# Patient Record
Sex: FEMALE | Race: WHITE | HISPANIC OR LATINO | URBAN - METROPOLITAN AREA
[De-identification: names, ages, dates, MRNs, and addresses within clinical notes are randomized per-mention and may not be internally consistent; named-entity substitution may affect disease eponyms.]

---

## 2017-04-19 ENCOUNTER — EMERGENCY (EMERGENCY)
Facility: HOSPITAL | Age: 77
LOS: 1 days | Discharge: HOME | End: 2017-04-19
Attending: EMERGENCY MEDICINE
Payer: COMMERCIAL

## 2017-04-19 VITALS
RESPIRATION RATE: 18 BRPM | TEMPERATURE: 98 F | DIASTOLIC BLOOD PRESSURE: 55 MMHG | OXYGEN SATURATION: 99 % | WEIGHT: 97 LBS | HEIGHT: 63 IN | HEART RATE: 62 BPM | SYSTOLIC BLOOD PRESSURE: 113 MMHG

## 2017-04-19 VITALS
SYSTOLIC BLOOD PRESSURE: 113 MMHG | HEART RATE: 58 BPM | RESPIRATION RATE: 17 BRPM | DIASTOLIC BLOOD PRESSURE: 52 MMHG | TEMPERATURE: 97 F | OXYGEN SATURATION: 100 %
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
## 2017-04-19 DIAGNOSIS — I10 ESSENTIAL (PRIMARY) HYPERTENSION: ICD-10-CM
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
## 2017-04-19 DIAGNOSIS — S50.869A: ICD-10-CM
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
## 2017-04-19 PROCEDURE — 99281 EMR DPT VST MAYX REQ PHY/QHP: CPT

## 2017-04-19 PROCEDURE — 99285 EMERGENCY DEPT VISIT HI MDM: CPT | Mod: 25

## 2017-04-20 NOTE — ED PROVIDER NOTE - OBJECTIVE STATEMENT
78 y/o F pt with PMHx of HTN, presents to ED c/o multiple itchy mosquito bites s/p traveling to [place]. Pt admits to scratching them and developing rashes. Pt denies fever, chills, nausea, vomiting, diarrhea, SOB, chest pain, or any other complaints. NKDA.

## 2017-04-20 NOTE — ED PROVIDER NOTE - NS ED MD SCRIBE ATTENDING SCRIBE SECTIONS
HIV/PHYSICAL EXAM/PAST MEDICAL/SURGICAL/SOCIAL HISTORY/REVIEW OF SYSTEMS/DISPOSITION/HISTORY OF PRESENT ILLNESS/VITAL SIGNS( [place])

## 2017-04-21 ENCOUNTER — TRANSCRIPTION ENCOUNTER (OUTPATIENT)
Age: 77
End: 2017-04-21

## 2018-05-17 ENCOUNTER — INPATIENT (INPATIENT)
Facility: HOSPITAL | Age: 78
LOS: 3 days | Discharge: HOME | DRG: 312 | End: 2018-05-21
Attending: GENERAL PRACTICE | Admitting: GENERAL PRACTICE
Payer: COMMERCIAL

## 2018-05-17 VITALS
DIASTOLIC BLOOD PRESSURE: 77 MMHG | HEART RATE: 90 BPM | RESPIRATION RATE: 17 BRPM | SYSTOLIC BLOOD PRESSURE: 166 MMHG | OXYGEN SATURATION: 97 % | TEMPERATURE: 98 F

## 2018-05-17 LAB
ALBUMIN SERPL ELPH-MCNC: 3.5 G/DL — SIGNIFICANT CHANGE UP (ref 3.5–5)
ALP SERPL-CCNC: 100 U/L — SIGNIFICANT CHANGE UP (ref 40–120)
ALT FLD-CCNC: 36 U/L DA — SIGNIFICANT CHANGE UP (ref 10–60)
ANION GAP SERPL CALC-SCNC: 4 MMOL/L — LOW (ref 5–17)
AST SERPL-CCNC: 21 U/L — SIGNIFICANT CHANGE UP (ref 10–40)
BASOPHILS # BLD AUTO: 0.1 K/UL — SIGNIFICANT CHANGE UP (ref 0–0.2)
BASOPHILS NFR BLD AUTO: 0.9 % — SIGNIFICANT CHANGE UP (ref 0–2)
BILIRUB SERPL-MCNC: 0.4 MG/DL — SIGNIFICANT CHANGE UP (ref 0.2–1.2)
BUN SERPL-MCNC: 11 MG/DL — SIGNIFICANT CHANGE UP (ref 7–18)
CALCIUM SERPL-MCNC: 9 MG/DL — SIGNIFICANT CHANGE UP (ref 8.4–10.5)
CHLORIDE SERPL-SCNC: 110 MMOL/L — HIGH (ref 96–108)
CO2 SERPL-SCNC: 29 MMOL/L — SIGNIFICANT CHANGE UP (ref 22–31)
CREAT SERPL-MCNC: 0.65 MG/DL — SIGNIFICANT CHANGE UP (ref 0.5–1.3)
EOSINOPHIL # BLD AUTO: 0.2 K/UL — SIGNIFICANT CHANGE UP (ref 0–0.5)
EOSINOPHIL NFR BLD AUTO: 2.7 % — SIGNIFICANT CHANGE UP (ref 0–6)
GLUCOSE SERPL-MCNC: 112 MG/DL — HIGH (ref 70–99)
HCT VFR BLD CALC: 39.4 % — SIGNIFICANT CHANGE UP (ref 34.5–45)
HGB BLD-MCNC: 13.2 G/DL — SIGNIFICANT CHANGE UP (ref 11.5–15.5)
LYMPHOCYTES # BLD AUTO: 1.3 K/UL — SIGNIFICANT CHANGE UP (ref 1–3.3)
LYMPHOCYTES # BLD AUTO: 18.7 % — SIGNIFICANT CHANGE UP (ref 13–44)
MCHC RBC-ENTMCNC: 30.9 PG — SIGNIFICANT CHANGE UP (ref 27–34)
MCHC RBC-ENTMCNC: 33.6 GM/DL — SIGNIFICANT CHANGE UP (ref 32–36)
MCV RBC AUTO: 91.9 FL — SIGNIFICANT CHANGE UP (ref 80–100)
MONOCYTES # BLD AUTO: 0.6 K/UL — SIGNIFICANT CHANGE UP (ref 0–0.9)
MONOCYTES NFR BLD AUTO: 9.4 % — SIGNIFICANT CHANGE UP (ref 2–14)
NEUTROPHILS # BLD AUTO: 4.6 K/UL — SIGNIFICANT CHANGE UP (ref 1.8–7.4)
NEUTROPHILS NFR BLD AUTO: 68.3 % — SIGNIFICANT CHANGE UP (ref 43–77)
PLATELET # BLD AUTO: 217 K/UL — SIGNIFICANT CHANGE UP (ref 150–400)
POTASSIUM SERPL-MCNC: 4 MMOL/L — SIGNIFICANT CHANGE UP (ref 3.5–5.3)
POTASSIUM SERPL-SCNC: 4 MMOL/L — SIGNIFICANT CHANGE UP (ref 3.5–5.3)
PROT SERPL-MCNC: 7.3 G/DL — SIGNIFICANT CHANGE UP (ref 6–8.3)
RBC # BLD: 4.29 M/UL — SIGNIFICANT CHANGE UP (ref 3.8–5.2)
RBC # FLD: 12.7 % — SIGNIFICANT CHANGE UP (ref 10.3–14.5)
SODIUM SERPL-SCNC: 143 MMOL/L — SIGNIFICANT CHANGE UP (ref 135–145)
TROPONIN I SERPL-MCNC: <0.015 NG/ML — SIGNIFICANT CHANGE UP (ref 0–0.04)
WBC # BLD: 6.8 K/UL — SIGNIFICANT CHANGE UP (ref 3.8–10.5)
WBC # FLD AUTO: 6.8 K/UL — SIGNIFICANT CHANGE UP (ref 3.8–10.5)

## 2018-05-17 PROCEDURE — 71046 X-RAY EXAM CHEST 2 VIEWS: CPT | Mod: 26

## 2018-05-17 RX ORDER — ASPIRIN/CALCIUM CARB/MAGNESIUM 324 MG
162 TABLET ORAL ONCE
Qty: 0 | Refills: 0 | Status: COMPLETED | OUTPATIENT
Start: 2018-05-17 | End: 2018-05-17

## 2018-05-17 RX ORDER — SODIUM CHLORIDE 9 MG/ML
1000 INJECTION INTRAMUSCULAR; INTRAVENOUS; SUBCUTANEOUS ONCE
Qty: 0 | Refills: 0 | Status: COMPLETED | OUTPATIENT
Start: 2018-05-17 | End: 2018-05-17

## 2018-05-17 RX ADMIN — Medication 162 MILLIGRAM(S): at 22:43

## 2018-05-17 RX ADMIN — SODIUM CHLORIDE 2000 MILLILITER(S): 9 INJECTION INTRAMUSCULAR; INTRAVENOUS; SUBCUTANEOUS at 22:44

## 2018-05-17 NOTE — ED PROVIDER NOTE - OBJECTIVE STATEMENT
Family as . 79 y/o F pt with PMHx of HTN (On Lisinopril) and HLD (on Simvastatin) and no significant PSHx presents with family to ED c/o palpitations with associated lightheadedness and general weakness since 18:30pm today (x1.5 hours PTA in ED). Pt's son also notes pt with episode of shaking earlier today. Additionally, pt reports chest pain. Pt describes palpitations as heart beat rapid. Per pt's son, pt visited her PMD earlier today and was started on Nadolol; pt has not yet begun taking Nadolol. Pt's son also states pt visited her cardiologist later in the evening today; on the walk back to her house from the cardiologist's office, pt begun experiencing current sx's, prompting pt to visit ED for evaluation. Pt reports normal urinary frequency today. Pt's son attributes pt's sx's to possible dehydration. Pt denies fever, chills, SOB, HA, vomiting, diarrhea, blurred vision, or any other complaints. Pt also denies Hx of DM, Hx of smoking, or EtOH use/abuse. Pt reports she does not know if she has undergone a stress test in the past. Pt notes taking Aspirin earlier today. Additional medications: Topiramate, Aspirin, Potassium. Cardiologist: Dr. Eric Goldberg. NKDA. Declines , son as . 79 y/o F pt with PMHx of HTN (On Lisinopril) and HLD (on Simvastatin) and no significant PSHx presents with family to ED c/o palpitations with associated lightheadedness and general weakness since 18:30pm today (x1.5 hours PTA in ED). Pt's son also notes pt with episode of tremors earlier today. Additionally, pt reports chest pain that began at the same time. Pt describes palpitations as heart beat rapid. Per pt's son, pt was started on Nadolol by PMD though has not yet begun taking Nadolol. Is asymptomatic at this time. Pt's son also states pt visited her cardiologist later in the evening today, who told her not to start the nadolol; on the walk back to her house from the cardiologist's office, pt begun experiencing current sx's, prompting pt to visit ED for evaluation. Pt reports her usual urinary frequency today. Pt's son attributes pt's sx's to possible dehydration. Pt denies fever, chills, SOB, HA, vomiting, diarrhea, blurred vision, or any other complaints. Pt also denies Hx of DM, Hx of smoking, or EtOH use/abuse. Pt reports she does not know if she has undergone a stress test in the past. Pt notes taking Aspirin earlier today. Additional medications: Topiramate, Aspirin, Potassium. PMD: Dr. Eric Goldberg. NKDA.

## 2018-05-17 NOTE — ED PROVIDER NOTE - CARE PLAN
Principal Discharge DX:	Chest pain on exertion  Secondary Diagnosis:	Palpitations  Secondary Diagnosis:	Mild dehydration

## 2018-05-17 NOTE — ED PROVIDER NOTE - PHYSICAL EXAMINATION
Afebrile, hemodynamically stable, saturating well  NAD, anxious, well appearing  Head NCAT  EOMI grossly, anicteric  MM mildly dry  No JVD  RRR, nml S1/S2, no m/r/g  Lungs CTAB, no w/r/r  Abd soft, NT, ND, nml BS, no rebound or guarding  AAO, CN's 3-12 grossly intact  MALDONADO spontaneously, no leg cyanosis or edema  Skin warm, dry, no rashes or hives

## 2018-05-18 DIAGNOSIS — E78.5 HYPERLIPIDEMIA, UNSPECIFIED: ICD-10-CM

## 2018-05-18 DIAGNOSIS — I10 ESSENTIAL (PRIMARY) HYPERTENSION: ICD-10-CM

## 2018-05-18 DIAGNOSIS — Z29.9 ENCOUNTER FOR PROPHYLACTIC MEASURES, UNSPECIFIED: ICD-10-CM

## 2018-05-18 DIAGNOSIS — R00.2 PALPITATIONS: ICD-10-CM

## 2018-05-18 DIAGNOSIS — R07.9 CHEST PAIN, UNSPECIFIED: ICD-10-CM

## 2018-05-18 LAB
24R-OH-CALCIDIOL SERPL-MCNC: 33 NG/ML — SIGNIFICANT CHANGE UP (ref 30–80)
CHOLEST SERPL-MCNC: 167 MG/DL — SIGNIFICANT CHANGE UP (ref 10–199)
CK MB BLD-MCNC: <1.6 % — SIGNIFICANT CHANGE UP (ref 0–3.5)
CK MB CFR SERPL CALC: <1 NG/ML — SIGNIFICANT CHANGE UP (ref 0–3.6)
CK SERPL-CCNC: 63 U/L — SIGNIFICANT CHANGE UP (ref 21–215)
HBA1C BLD-MCNC: 5.5 % — SIGNIFICANT CHANGE UP (ref 4–5.6)
HDLC SERPL-MCNC: 71 MG/DL — SIGNIFICANT CHANGE UP (ref 40–125)
LIPID PNL WITH DIRECT LDL SERPL: 82 MG/DL — SIGNIFICANT CHANGE UP
TOTAL CHOLESTEROL/HDL RATIO MEASUREMENT: 2.4 RATIO — LOW (ref 3.3–7.1)
TRIGL SERPL-MCNC: 68 MG/DL — SIGNIFICANT CHANGE UP (ref 10–149)
TROPONIN I SERPL-MCNC: <0.015 NG/ML — SIGNIFICANT CHANGE UP (ref 0–0.04)
TSH SERPL-MCNC: 0.66 UU/ML — SIGNIFICANT CHANGE UP (ref 0.34–4.82)
VIT B12 SERPL-MCNC: 802 PG/ML — SIGNIFICANT CHANGE UP (ref 232–1245)

## 2018-05-18 PROCEDURE — 99285 EMERGENCY DEPT VISIT HI MDM: CPT

## 2018-05-18 RX ORDER — LISINOPRIL 2.5 MG/1
10 TABLET ORAL DAILY
Qty: 0 | Refills: 0 | Status: DISCONTINUED | OUTPATIENT
Start: 2018-05-18 | End: 2018-05-21

## 2018-05-18 RX ORDER — OXYBUTYNIN CHLORIDE 5 MG
5 TABLET ORAL DAILY
Qty: 0 | Refills: 0 | Status: DISCONTINUED | OUTPATIENT
Start: 2018-05-18 | End: 2018-05-18

## 2018-05-18 RX ORDER — TOPIRAMATE 25 MG
50 TABLET ORAL
Qty: 0 | Refills: 0 | Status: DISCONTINUED | OUTPATIENT
Start: 2018-05-18 | End: 2018-05-21

## 2018-05-18 RX ORDER — SIMVASTATIN 20 MG/1
20 TABLET, FILM COATED ORAL AT BEDTIME
Qty: 0 | Refills: 0 | Status: DISCONTINUED | OUTPATIENT
Start: 2018-05-18 | End: 2018-05-21

## 2018-05-18 RX ORDER — ACETAMINOPHEN 500 MG
650 TABLET ORAL EVERY 6 HOURS
Qty: 0 | Refills: 0 | Status: DISCONTINUED | OUTPATIENT
Start: 2018-05-18 | End: 2018-05-21

## 2018-05-18 RX ORDER — ENOXAPARIN SODIUM 100 MG/ML
30 INJECTION SUBCUTANEOUS DAILY
Qty: 0 | Refills: 0 | Status: DISCONTINUED | OUTPATIENT
Start: 2018-05-18 | End: 2018-05-21

## 2018-05-18 RX ORDER — OXYBUTYNIN CHLORIDE 5 MG
5 TABLET ORAL DAILY
Qty: 0 | Refills: 0 | Status: DISCONTINUED | OUTPATIENT
Start: 2018-05-18 | End: 2018-05-21

## 2018-05-18 RX ORDER — ASPIRIN/CALCIUM CARB/MAGNESIUM 324 MG
81 TABLET ORAL DAILY
Qty: 0 | Refills: 0 | Status: DISCONTINUED | OUTPATIENT
Start: 2018-05-18 | End: 2018-05-21

## 2018-05-18 RX ADMIN — Medication 5 MILLIGRAM(S): at 12:12

## 2018-05-18 RX ADMIN — Medication 50 MILLIGRAM(S): at 05:40

## 2018-05-18 RX ADMIN — SIMVASTATIN 20 MILLIGRAM(S): 20 TABLET, FILM COATED ORAL at 21:38

## 2018-05-18 RX ADMIN — Medication 50 MILLIGRAM(S): at 17:33

## 2018-05-18 RX ADMIN — Medication 81 MILLIGRAM(S): at 12:12

## 2018-05-18 RX ADMIN — LISINOPRIL 10 MILLIGRAM(S): 2.5 TABLET ORAL at 05:39

## 2018-05-18 RX ADMIN — Medication 650 MILLIGRAM(S): at 20:27

## 2018-05-18 RX ADMIN — ENOXAPARIN SODIUM 30 MILLIGRAM(S): 100 INJECTION SUBCUTANEOUS at 12:12

## 2018-05-18 RX ADMIN — Medication 650 MILLIGRAM(S): at 22:15

## 2018-05-18 NOTE — H&P ADULT - NSHPLABSRESULTS_GEN_ALL_CORE
Vital Signs Last 24 Hrs  T(C): 36.8 (18 May 2018 00:57), Max: 36.8 (17 May 2018 20:33)  T(F): 98.2 (18 May 2018 00:57), Max: 98.2 (17 May 2018 20:33)  HR: 62 (18 May 2018 00:57) (62 - 90)  BP: 137/57 (18 May 2018 00:57) (137/57 - 166/77)  BP(mean): --  RR: 16 (18 May 2018 00:57) (16 - 17)  SpO2: 99% (18 May 2018 00:57) (97% - 99%)  EKG: NSR at 93 bpm.

## 2018-05-18 NOTE — H&P ADULT - ASSESSMENT
77 y/o F pt with PMHx of HTN and HLd presented with family to ED complaining of palpitations with associated lightheadedness and general weakness since 18:30pm today.  Patient is on Topiramate for pain, not sure about specific cause. 79 y/o F pt with PMHx of HTN and HLd presented with family to ED complaining of palpitations with associated lightheadedness and general weakness since 18:30pm today.  Patient is on Topiramate for pain, not sure about specific cause, will continue. Continue Oxybutynin.

## 2018-05-18 NOTE — H&P ADULT - HISTORY OF PRESENT ILLNESS
77 y/o F pt with PMHx of HTN and HLd presented with family to ED complainnig palpitations with associated lightheadedness and general weakness since 18:30pm today. Per pt's son, pt was started on Nadolol by PMD though has not yet begun taking Nadolol. But Cardiology recommended to stop b blocker. Denies fever, chills, SOB, HA, nausea, vomiting, diarrhea, blurred vision, or any other complaints.   Sh: non smoker non alcoholic, denies illicit drug use. Fh: not significant as per son.   Patient is not sure about last Echo or NST. 79 y/o F pt with PMHx of HTN and HLd presented with family to ED complainnig palpitations with associated lightheadedness and general weakness since 18:30pm today. Per pt's son, pt was started on Nadolol by PMD though has not yet begun taking Nadolol. But Cardiology recommended to stop b blocker.   Denies fever, chills, SOB, HA, nausea, vomiting, diarrhea, blurred vision, or any other complaints.   Sh: non smoker non alcoholic, denies illicit drug use. Fh: not significant as per son.   Patient is not sure about last Echo or NST.

## 2018-05-18 NOTE — H&P ADULT - PROBLEM SELECTOR PLAN 4
IMPROVE VTE score: 2, Lovenox  [ ] Previous VTE                                                3  [ ] Thrombophilia                                             2  [ ] Lower limb paralysis                                  2        (unable to hold up >15 seconds)    [ ] Current Cancer (within 6 months)            2   [x] Immobilization > 24 hrs                              1  [ ] ICU/CCU stay > 24 hours                            1  [x] Age > 60                                                         1

## 2018-05-18 NOTE — ED ADULT NURSE NOTE - ED STAT RN HANDOFF DETAILS
pt.remained   stable.  denies  pain,  on  tele  box  L  with NSR.  transfer to  holding  area  report   given  to  shira vazquez.not  in distress

## 2018-05-18 NOTE — H&P ADULT - NSHPREVIEWOFSYSTEMS_GEN_ALL_CORE
GEN: no fever, no chills, mild chest pain   RESP: no SOB, no cough, no sputum  CVS: mild chest pain, no palpitations, no edema  GI: no abdominal pain, no nausea, no vomiting, no constipation, no diarrhea  : no dysuria, no frequency, no hematuria  NEURO: no headache, no dizziness  PSYCH: no depression, not anxious  Derm : no itching, no rash

## 2018-05-18 NOTE — H&P ADULT - PROBLEM SELECTOR PLAN 1
Patient presented with palpitations since 1 day  Admitted to telemetry for concern of arrhythmias  Troponin times 1 negative, follow T2  Started aspirin, statin and b blocker  Follow TSH. Follow Echo   Cardiology consulted. Patient presented with palpitations since 1 day  Admitted to telemetry for concern of arrhythmias  EKG NSR with 93 bpm  Troponin times 1 negative, follow T2  Started aspirin, statin and b blocker  Follow TSH. Follow Echo   Cardiology consulted Dr Merritt

## 2018-05-18 NOTE — ED ADULT NURSE NOTE - ED STAT RN HANDOFF DETAILS 2
Pt received for THEODORE Martinez. pt Yi speaking. pt aox3. Pt hemodynamically stable. Pt in tele box L with NSR. Pt endorsed to Theodore Espinoza in the holding. pt not in distress. Pt awaiting bed for admission.

## 2018-05-18 NOTE — CONSULT NOTE ADULT - ASSESSMENT
78 yr old  Female  with PMHx of HTN and HLd presented with family to ED complainnig palpitations with associated lightheadedness and general weakness.  1.Tele monitoring.  2.Echocardiogram.  3.Orthostatic BP q shift.  4.Continue current medication.  5.GI and DVT prophylaxis.

## 2018-05-19 LAB
ALBUMIN SERPL ELPH-MCNC: 3.3 G/DL — LOW (ref 3.5–5)
ALP SERPL-CCNC: 90 U/L — SIGNIFICANT CHANGE UP (ref 40–120)
ALT FLD-CCNC: 28 U/L DA — SIGNIFICANT CHANGE UP (ref 10–60)
ANION GAP SERPL CALC-SCNC: 6 MMOL/L — SIGNIFICANT CHANGE UP (ref 5–17)
APTT BLD: 32.8 SEC — SIGNIFICANT CHANGE UP (ref 27.5–37.4)
AST SERPL-CCNC: 14 U/L — SIGNIFICANT CHANGE UP (ref 10–40)
BASOPHILS # BLD AUTO: 0 K/UL — SIGNIFICANT CHANGE UP (ref 0–0.2)
BASOPHILS NFR BLD AUTO: 0.6 % — SIGNIFICANT CHANGE UP (ref 0–2)
BILIRUB SERPL-MCNC: 0.5 MG/DL — SIGNIFICANT CHANGE UP (ref 0.2–1.2)
BUN SERPL-MCNC: 17 MG/DL — SIGNIFICANT CHANGE UP (ref 7–18)
CALCIUM SERPL-MCNC: 9.5 MG/DL — SIGNIFICANT CHANGE UP (ref 8.4–10.5)
CHLORIDE SERPL-SCNC: 111 MMOL/L — HIGH (ref 96–108)
CO2 SERPL-SCNC: 25 MMOL/L — SIGNIFICANT CHANGE UP (ref 22–31)
CREAT SERPL-MCNC: 0.78 MG/DL — SIGNIFICANT CHANGE UP (ref 0.5–1.3)
EOSINOPHIL # BLD AUTO: 0.3 K/UL — SIGNIFICANT CHANGE UP (ref 0–0.5)
EOSINOPHIL NFR BLD AUTO: 7 % — HIGH (ref 0–6)
GLUCOSE SERPL-MCNC: 91 MG/DL — SIGNIFICANT CHANGE UP (ref 70–99)
HCT VFR BLD CALC: 41.8 % — SIGNIFICANT CHANGE UP (ref 34.5–45)
HGB BLD-MCNC: 13.7 G/DL — SIGNIFICANT CHANGE UP (ref 11.5–15.5)
INR BLD: 1.02 RATIO — SIGNIFICANT CHANGE UP (ref 0.88–1.16)
LYMPHOCYTES # BLD AUTO: 1.3 K/UL — SIGNIFICANT CHANGE UP (ref 1–3.3)
LYMPHOCYTES # BLD AUTO: 26.3 % — SIGNIFICANT CHANGE UP (ref 13–44)
MAGNESIUM SERPL-MCNC: 2.3 MG/DL — SIGNIFICANT CHANGE UP (ref 1.6–2.6)
MCHC RBC-ENTMCNC: 30.2 PG — SIGNIFICANT CHANGE UP (ref 27–34)
MCHC RBC-ENTMCNC: 32.7 GM/DL — SIGNIFICANT CHANGE UP (ref 32–36)
MCV RBC AUTO: 92.5 FL — SIGNIFICANT CHANGE UP (ref 80–100)
MONOCYTES # BLD AUTO: 0.6 K/UL — SIGNIFICANT CHANGE UP (ref 0–0.9)
MONOCYTES NFR BLD AUTO: 12.5 % — SIGNIFICANT CHANGE UP (ref 2–14)
NEUTROPHILS # BLD AUTO: 2.6 K/UL — SIGNIFICANT CHANGE UP (ref 1.8–7.4)
NEUTROPHILS NFR BLD AUTO: 53.7 % — SIGNIFICANT CHANGE UP (ref 43–77)
PHOSPHATE SERPL-MCNC: 3.8 MG/DL — SIGNIFICANT CHANGE UP (ref 2.5–4.5)
PLATELET # BLD AUTO: 202 K/UL — SIGNIFICANT CHANGE UP (ref 150–400)
POTASSIUM SERPL-MCNC: 3.6 MMOL/L — SIGNIFICANT CHANGE UP (ref 3.5–5.3)
POTASSIUM SERPL-SCNC: 3.6 MMOL/L — SIGNIFICANT CHANGE UP (ref 3.5–5.3)
PROT SERPL-MCNC: 7 G/DL — SIGNIFICANT CHANGE UP (ref 6–8.3)
PROTHROM AB SERPL-ACNC: 11.1 SEC — SIGNIFICANT CHANGE UP (ref 9.8–12.7)
RBC # BLD: 4.52 M/UL — SIGNIFICANT CHANGE UP (ref 3.8–5.2)
RBC # FLD: 12.7 % — SIGNIFICANT CHANGE UP (ref 10.3–14.5)
SODIUM SERPL-SCNC: 142 MMOL/L — SIGNIFICANT CHANGE UP (ref 135–145)
WBC # BLD: 4.9 K/UL — SIGNIFICANT CHANGE UP (ref 3.8–10.5)
WBC # FLD AUTO: 4.9 K/UL — SIGNIFICANT CHANGE UP (ref 3.8–10.5)

## 2018-05-19 RX ADMIN — ENOXAPARIN SODIUM 30 MILLIGRAM(S): 100 INJECTION SUBCUTANEOUS at 12:24

## 2018-05-19 RX ADMIN — Medication 50 MILLIGRAM(S): at 18:57

## 2018-05-19 RX ADMIN — Medication 5 MILLIGRAM(S): at 15:15

## 2018-05-19 RX ADMIN — Medication 81 MILLIGRAM(S): at 12:24

## 2018-05-19 RX ADMIN — Medication 50 MILLIGRAM(S): at 06:08

## 2018-05-19 RX ADMIN — LISINOPRIL 10 MILLIGRAM(S): 2.5 TABLET ORAL at 06:08

## 2018-05-19 RX ADMIN — SIMVASTATIN 20 MILLIGRAM(S): 20 TABLET, FILM COATED ORAL at 21:28

## 2018-05-19 NOTE — PROGRESS NOTE ADULT - SUBJECTIVE AND OBJECTIVE BOX
CARDIOLOGY     PROGRESS  NOTE   ________________________________________________    CHIEF COMPLAINT:Patient is a 78y old  Female who presents with a chief complaint of Palpitations (18 May 2018 01:49)    	  REVIEW OF SYSTEMS:  CONSTITUTIONAL: No fever, weight loss, or fatigue  EYES: No eye pain, visual disturbances, or discharge  ENT:  No difficulty hearing, tinnitus, vertigo; No sinus or throat pain  NECK: No pain or stiffness  RESPIRATORY: No cough, wheezing, chills or hemoptysis; No Shortness of Breath  CARDIOVASCULAR: No chest pain, palpitations, passing out, dizziness, or leg swelling  GASTROINTESTINAL: No abdominal or epigastric pain. No nausea, vomiting, or hematemesis; No diarrhea or constipation. No melena or hematochezia.  GENITOURINARY: No dysuria, frequency, hematuria, or incontinence  NEUROLOGICAL: No headaches, memory loss, loss of strength, numbness, or tremors  SKIN: No itching, burning, rashes, or lesions   LYMPH Nodes: No enlarged glands  ENDOCRINE: No heat or cold intolerance; No hair loss  MUSCULOSKELETAL: No joint pain or swelling; No muscle, back, or extremity pain  PSYCHIATRIC: No depression, anxiety, mood swings, or difficulty sleeping  HEME/LYMPH: No easy bruising, or bleeding gums  ALLERGY AND IMMUNOLOGIC: No hives or eczema	    [ ] All others negative	  [ ] Unable to obtain    PHYSICAL EXAM:  T(C): 36.9 (05-19-18 @ 08:41), Max: 37.3 (05-18-18 @ 15:57)  HR: 61 (05-19-18 @ 08:41) (61 - 79)  BP: 127/50 (05-19-18 @ 08:41) (120/61 - 177/59)  RR: 18 (05-19-18 @ 08:41) (17 - 18)  SpO2: 98% (05-19-18 @ 08:41) (97% - 100%)  Wt(kg): --  I&O's Summary    18 May 2018 07:01  -  19 May 2018 07:00  --------------------------------------------------------  IN: 200 mL / OUT: 200 mL / NET: 0 mL        Appearance: Normal	  HEENT:   Normal oral mucosa, PERRL, EOMI	  Lymphatic: No lymphadenopathy  Cardiovascular: Normal S1 S2, No JVD, No murmurs, No edema  Respiratory: Lungs clear to auscultation	  Psychiatry: A & O x 3, Mood & affect appropriate  Gastrointestinal:  Soft, Non-tender, + BS	  Skin: No rashes, No ecchymoses, No cyanosis	  Neurologic: Non-focal  Extremities: Normal range of motion, No clubbing, cyanosis or edema  Vascular: Peripheral pulses palpable 2+ bilaterally    MEDICATIONS  (STANDING):  aspirin  chewable 81 milliGRAM(s) Oral daily  enoxaparin Injectable 30 milliGRAM(s) SubCutaneous daily  lisinopril 10 milliGRAM(s) Oral daily  oxybutynin XL 5 milliGRAM(s) Oral daily  simvastatin 20 milliGRAM(s) Oral at bedtime  topiramate 50 milliGRAM(s) Oral two times a day      TELEMETRY: nsr	    ECG:  	  RADIOLOGY:  OTHER: 	  	  LABS:	 	    CARDIAC MARKERS:  CARDIAC MARKERS ( 18 May 2018 06:02 )  <0.015 ng/mL / x     / 63 U/L / x     / <1.0 ng/mL  CARDIAC MARKERS ( 17 May 2018 22:53 )  <0.015 ng/mL / x     / x     / x     / x                                    13.7   4.9   )-----------( 202      ( 19 May 2018 08:07 )             41.8     05-19    142  |  111<H>  |  17  ----------------------------<  91  3.6   |  25  |  0.78    Ca    9.5      19 May 2018 08:07  Phos  3.8     05-19  Mg     2.3     05-19    TPro  7.0  /  Alb  3.3<L>  /  TBili  0.5  /  DBili  x   /  AST  14  /  ALT  28  /  AlkPhos  90  05-19    proBNP:   Lipid Profile: Cholesterol 167  LDL 82  HDL 71  TG 68    HgA1c: Hemoglobin A1C, Whole Blood: 5.5 % (05-18 @ 09:27)    TSH: Thyroid Stimulating Hormone, Serum: 0.66 uU/mL (05-18 @ 06:02)    PT/INR - ( 19 May 2018 08:07 )   PT: 11.1 sec;   INR: 1.02 ratio         PTT - ( 19 May 2018 08:07 )  PTT:32.8 sec      Assessment and plan  ---------------------------  78 yr old  Female  with PMHx of HTN and HLd presented with family to ED complaining palpitations with associated lightheadedness and general weakness.  1.Tele monitoring.  2.Echocardiogram.  3.Orthostatic BP q shift.  4.Continue current medication.  5.GI and DVT prophylaxis.

## 2018-05-19 NOTE — PROGRESS NOTE ADULT - SUBJECTIVE AND OBJECTIVE BOX
_________________________________________________________________________________________  ========>>  M E D I C A L   A T T E N D I N G    F O L L O W  U P  N O T E  <<=========  -----------------------------------------------------------------------------------------------------    - Patient seen and examined by me approximately thirty minutes ago.  - In summary, patient is a 78y year old woman who originally presented with Palpitations  - Patient today overall doing ok, comfortable, eating OK.  overall improved    ==================>> REVIEW OF SYSTEM <<=================    GEN: no fever, no chills, no pain  RESP: no SOB, no cough, no sputum  CVS: no chest pain, no palpitations, no edema  GI: no abdominal pain, no nausea, no constipation, no diarrhea  : no dysuria, no frequency, no hematuria  Neuro: no headache, no dizziness  Derm : no itching, no rash    ==================>> PHYSICAL EXAM <<=================    GEN: A&O X 3 , NAD , comfortable, cachectic / weak appearing but ore alert   HEENT: NCAT, PERRL, MMM, hearing intact  Neck: supple , no JVD  CVS: S1S2 , regular , No M/R/G appreciated  PULM: CTA B/L,  no W/R/R appreciated  ABD.: soft. non tender, non distended,  bowel sounds present  Extrem: intact pulses , no edema   PSYCH : normal mood,  not anxious      ==================>> MEDICATIONS <<====================    MEDICATIONS  (STANDING):  aspirin  chewable 81 milliGRAM(s) Oral daily  enoxaparin Injectable 30 milliGRAM(s) SubCutaneous daily  lisinopril 10 milliGRAM(s) Oral daily  oxybutynin XL 5 milliGRAM(s) Oral daily  simvastatin 20 milliGRAM(s) Oral at bedtime  topiramate 50 milliGRAM(s) Oral two times a day    MEDICATIONS  (PRN):  acetaminophen   Tablet. 650 milliGRAM(s) Oral every 6 hours PRN Mild Pain (1 - 3)    ==================>> VITAL SIGNS <<==================    T(C): 36.9 (05-19-18 @ 08:41), Max: 37.3 (05-18-18 @ 15:57)  HR: 61 (05-19-18 @ 08:41) (61 - 79)  BP: 127/50 (05-19-18 @ 08:41) (120/61 - 177/59)  RR: 18 (05-19-18 @ 08:41) (17 - 18)  SpO2: 98% (05-19-18 @ 08:41) (97% - 100%)    I&O's Summary    18 May 2018 07:01  -  19 May 2018 07:00  --------------------------------------------------------  IN: 200 mL / OUT: 200 mL / NET: 0 mL     ==================>> LAB AND IMAGING <<==================                        13.7   4.9   )-----------( 202      ( 19 May 2018 08:07 )             41.8        05-19    142  |  111<H>  |  17  ----------------------------<  91  3.6   |  25  |  0.78    Ca    9.5      19 May 2018 08:07  Phos  3.8     05-19  Mg     2.3     05-19    TPro  7.0  /  Alb  3.3<L>  /  TBili  0.5  /  DBili  x   /  AST  14  /  ALT  28  /  AlkPhos  90  05-19    PT/INR - ( 19 May 2018 08:07 )   PT: 11.1 sec;   INR: 1.02 ratio    PTT - ( 19 May 2018 08:07 )  PTT:32.8 sec              CARDIAC MARKERS ( 18 May 2018 06:02 )  <0.015 ng/mL / x     / 63 U/L / x     / <1.0 ng/mL  CARDIAC MARKERS ( 17 May 2018 22:53 )  <0.015 ng/mL / x     / x     / x     / x        TSH:      0.66   (05-18-18)           Lipid profile:  (05-18-18)     Total: 167     LDL  : 82     HDL  :71     TG   :68     HgA1C: 5.5  (05-18-18)            < from: Transthoracic Echocardiogram (05.18.18 @ 07:13) >  CONCLUSIONS:  1. Normal mitral valve. Mild mitral regurgitation.  2. Normal trileaflet aortic valve.  3. Aortic Root: 3.0 cm.  4. Normal left atrium.  5. Normal left ventricular internal dimensions and wall thicknesses.  6. Normal Left Ventricular Systolic Function,  (EF = 55 to 60%)  7. Grade II diastolic dysfunction.  8. Normal right atrium.  9. Normal right ventricular size and function.  10. RA Pressure is 10 mm Hg.  11. RV systolic pressure is 35 mm Hg.  12. There is mild tricuspid regurgitation.  13. Normal pericardium with no pericardial effusion.  < end of copied text >    __________________________________________________________________________________  ===============>>  A S S E S S M E N T   A N D   P L A N <<===============  ------------------------------------------------------------------------------------------    · Assessment		  79 y/o F pt with PMHx of HTN and HLd presented with family to ED complaining of palpitations with associated lightheadedness and general weakness.    Problem/Plan - 1:  ·  Problem: Palpitations  telemetry monitoring   CE negative  Cardiology appreciated  echo as above  TSH normal   ? ischemic workup?    Problem/Plan - 2:  ·  Problem: HTN  Continue Current medications and monitor.     Problem/Plan - 3:  ·  Problem: HLD   Continue Current medications and monitor.     -GI/DVT Prophylaxis.    --------------------------------------------  Case discussed with pt, cardio  Education given on findings and plan of care  ___________________________  H. PAOLA Herron.  Pager: [phone]

## 2018-05-20 RX ADMIN — Medication 50 MILLIGRAM(S): at 18:02

## 2018-05-20 RX ADMIN — Medication 650 MILLIGRAM(S): at 22:09

## 2018-05-20 RX ADMIN — LISINOPRIL 10 MILLIGRAM(S): 2.5 TABLET ORAL at 05:53

## 2018-05-20 RX ADMIN — Medication 81 MILLIGRAM(S): at 12:44

## 2018-05-20 RX ADMIN — SIMVASTATIN 20 MILLIGRAM(S): 20 TABLET, FILM COATED ORAL at 22:14

## 2018-05-20 RX ADMIN — Medication 650 MILLIGRAM(S): at 23:12

## 2018-05-20 RX ADMIN — Medication 5 MILLIGRAM(S): at 12:44

## 2018-05-20 RX ADMIN — Medication 50 MILLIGRAM(S): at 05:53

## 2018-05-20 RX ADMIN — ENOXAPARIN SODIUM 30 MILLIGRAM(S): 100 INJECTION SUBCUTANEOUS at 12:44

## 2018-05-20 NOTE — PROGRESS NOTE ADULT - SUBJECTIVE AND OBJECTIVE BOX
CARDIOLOGY     PROGRESS  NOTE   ________________________________________________    CHIEF COMPLAINT:Patient is a 78y old  Female who presents with a chief complaint of Palpitations (18 May 2018 01:49)  no complain.  	  REVIEW OF SYSTEMS:  CONSTITUTIONAL: No fever, weight loss, or fatigue  EYES: No eye pain, visual disturbances, or discharge  ENT:  No difficulty hearing, tinnitus, vertigo; No sinus or throat pain  NECK: No pain or stiffness  RESPIRATORY: No cough, wheezing, chills or hemoptysis; No Shortness of Breath  CARDIOVASCULAR: No chest pain, palpitations, passing out, dizziness, or leg swelling  GASTROINTESTINAL: No abdominal or epigastric pain. No nausea, vomiting, or hematemesis; No diarrhea or constipation. No melena or hematochezia.  GENITOURINARY: No dysuria, frequency, hematuria, or incontinence  NEUROLOGICAL: No headaches, memory loss, loss of strength, numbness, or tremors  SKIN: No itching, burning, rashes, or lesions   LYMPH Nodes: No enlarged glands  ENDOCRINE: No heat or cold intolerance; No hair loss  MUSCULOSKELETAL: No joint pain or swelling; No muscle, back, or extremity pain  PSYCHIATRIC: No depression, anxiety, mood swings, or difficulty sleeping  HEME/LYMPH: No easy bruising, or bleeding gums  ALLERGY AND IMMUNOLOGIC: No hives or eczema	    [ ] All others negative	  [ ] Unable to obtain    PHYSICAL EXAM:  T(C): 36.4 (05-20-18 @ 08:26), Max: 37.3 (05-19-18 @ 12:01)  HR: 64 (05-20-18 @ 08:26) (60 - 69)  BP: 122/43 (05-20-18 @ 08:26) (121/53 - 128/56)  RR: 18 (05-20-18 @ 08:26) (16 - 18)  SpO2: 99% (05-20-18 @ 08:26) (98% - 100%)  Wt(kg): --  I&O's Summary      Appearance: Normal	  HEENT:   Normal oral mucosa, PERRL, EOMI	  Lymphatic: No lymphadenopathy  Cardiovascular: Normal S1 S2, No JVD, + murmurs, No edema  Respiratory: Lungs clear to auscultation	  Psychiatry: A & O x 3, Mood & affect appropriate  Gastrointestinal:  Soft, Non-tender, + BS	  Skin: No rashes, No ecchymoses, No cyanosis	  Neurologic: Non-focal  Extremities: Normal range of motion, No clubbing, cyanosis or edema  Vascular: Peripheral pulses palpable 2+ bilaterally    MEDICATIONS  (STANDING):  aspirin  chewable 81 milliGRAM(s) Oral daily  enoxaparin Injectable 30 milliGRAM(s) SubCutaneous daily  lisinopril 10 milliGRAM(s) Oral daily  oxybutynin XL 5 milliGRAM(s) Oral daily  simvastatin 20 milliGRAM(s) Oral at bedtime  topiramate 50 milliGRAM(s) Oral two times a day      TELEMETRY bradycardia    ECG:  	  RADIOLOGY:  OTHER: 	  	  LABS:	 	    CARDIAC MARKERS:                                13.7   4.9   )-----------( 202      ( 19 May 2018 08:07 )             41.8     05-19    142  |  111<H>  |  17  ----------------------------<  91  3.6   |  25  |  0.78    Ca    9.5      19 May 2018 08:07  Phos  3.8     05-19  Mg     2.3     05-19    TPro  7.0  /  Alb  3.3<L>  /  TBili  0.5  /  DBili  x   /  AST  14  /  ALT  28  /  AlkPhos  90  05-19    proBNP:   Lipid Profile: Cholesterol 167  LDL 82  HDL 71  TG 68    HgA1c: Hemoglobin A1C, Whole Blood: 5.5 % (05-18 @ 09:27)    TSH: Thyroid Stimulating Hormone, Serum: 0.66 uU/mL (05-18 @ 06:02)    PT/INR - ( 19 May 2018 08:07 )   PT: 11.1 sec;   INR: 1.02 ratio         PTT - ( 19 May 2018 08:07 )  PTT:32.8 sec  < from: Transthoracic Echocardiogram (05.18.18 @ 07:13) >  1. Normal mitral valve. Mild mitral regurgitation.  2. Normal trileaflet aortic valve.  3. Aortic Root: 3.0 cm.  4. Normal left atrium.  5. Normal left ventricular internal dimensions and wall  thicknesses.  6. Normal Left Ventricular Systolic Function,  (EF = 55 to  60%)  7. Grade II diastolic dysfunction.  8. Normal right atrium.  9. Normal right ventricular size and function.  10. RA Pressure is 10 mm Hg.  11. RV systolic pressure is 35 mm Hg.  12. There is mildtricuspid regurgitation.  13. Small pericardial effusion. No echocardiographic  evidence of tamponade physiology.    < end of copied text >      Assessment and plan  ---------------------------  78 yr old  Female  with PMHx of HTN and HLd presented with family to ED complaining palpitations with associated lightheadedness and general weakness.  1.Tele monitoring.  2.Echocardiogram noted  3.Orthostatic BP q shift.  4.Continue current medication.  5.GI and DVT prophylaxis.  6. bradycardia ,increase ambulation and observe hr

## 2018-05-20 NOTE — PROGRESS NOTE ADULT - SUBJECTIVE AND OBJECTIVE BOX
_________________________________________________________________________________________  ========>>  M E D I C A L   A T T E N D I N G    F O L L O W  U P  N O T E  <<=========  -----------------------------------------------------------------------------------------------------    - Patient seen and examined by me approximately thirty minutes ago.  - In summary, patient is a 78y year old woman who originally presented with Palpitations  - Patient today overall doing ok, comfortable, eating OK.  overall improved, c/o mild lightheadedness    ==================>> REVIEW OF SYSTEM <<=================    GEN: no fever, no chills, no pain  RESP: no SOB, no cough, no sputum  CVS: no chest pain, no palpitations, no edema  GI: no abdominal pain, no nausea, no constipation, no diarrhea  : no dysuria, no frequency, no hematuria  Neuro: no headache, mild lightheadedness, c/o some numbness on hands R>L  Derm : no itching, no rash    ==================>> PHYSICAL EXAM <<=================    GEN: A&O X 3 , NAD , comfortable, cachectic / weak appearing in bed  HEENT: NCAT, PERRL, MMM, hearing intact  Neck: supple , no JVD  CVS: S1S2 , regular , No M/R/G appreciated  PULM: CTA B/L,  no W/R/R appreciated  ABD.: soft. non tender, non distended,  bowel sounds present  Extrem: intact pulses , no edema   PSYCH : normal mood,  not anxious       ==================>> MEDICATIONS <<====================    aspirin  chewable 81 milliGRAM(s) Oral daily  enoxaparin Injectable 30 milliGRAM(s) SubCutaneous daily  lisinopril 10 milliGRAM(s) Oral daily  oxybutynin XL 5 milliGRAM(s) Oral daily  simvastatin 20 milliGRAM(s) Oral at bedtime  topiramate 50 milliGRAM(s) Oral two times a day    MEDICATIONS  (PRN):  acetaminophen   Tablet. 650 milliGRAM(s) Oral every 6 hours PRN Mild Pain (1 - 3)    ==================>> VITAL SIGNS <<==================    Vital Signs Last 24 Hrs  T(C): 36.4 (05-20-18 @ 08:26)  T(F): 97.6 (05-20-18 @ 08:26), Max: 99.2 (05-19-18 @ 12:01)  HR: 64 (05-20-18 @ 08:26) (60 - 69)  BP: 122/43 (05-20-18 @ 08:26)  BP(mean): --  RR: 18 (05-20-18 @ 08:26) (16 - 18)  SpO2: 99% (05-20-18 @ 08:26) (98% - 100%)      orthostatics +     ==================>> LAB AND IMAGING <<==================                        13.7   4.9   )-----------( 202      ( 19 May 2018 08:07 )             41.8         142  |  111<H>  |  17  ----------------------------<  91  3.6   |  25  |  0.78    Ca    9.5      19 May 2018 08:07  Phos  3.8     05-19  Mg     2.3     05-19    TPro  7.0  /  Alb  3.3<L>  /  TBili  0.5  /  DBili  x   /  AST  14  /  ALT  28  /  AlkPhos  90  05-19    < from: Transthoracic Echocardiogram (05.18.18 @ 07:13) >  CONCLUSIONS:  1. Normal mitral valve. Mild mitral regurgitation.  2. Normal trileaflet aortic valve.  3. Aortic Root: 3.0 cm.  4. Normal left atrium.  5. Normal left ventricular internal dimensions and wall thicknesses.  6. Normal Left Ventricular Systolic Function,  (EF = 55 to 60%)  7. Grade II diastolic dysfunction.  8. Normal right atrium.  9. Normal right ventricular size and function.  10. RA Pressure is 10 mm Hg.  11. RV systolic pressure is 35 mm Hg.  12. There is mild tricuspid regurgitation.  13. Normal pericardium with no pericardial effusion.  < end of copied text >    __________________________________________________________________________________  ===============>>  A S S E S S M E N T   A N D   P L A N <<===============  ------------------------------------------------------------------------------------------    · Assessment		  79 y/o F pt with PMHx of HTN and HLd presented with family to ED complaining of palpitations with associated lightheadedness and general weakness.    Problem/Plan - 1:  ·  Problem: Palpitations, improved   telemetry monitoring given some bradycardia  pt likely also deconditioned   Cardiology appreciated  echo as above  ? need for  ischemic workup?  OOB, PT, ambulate     Problem/Plan - 2:  ·  Problem: HTN  Continue Current medications and monitor.     Problem/Plan - 3:  ·  Problem: HLD   Continue Current medications and monitor.     Problem/Plan - 4:  ·  Problem: lightheaded and numbess  B12 normal  check CT head  monitor Orthostatics  PT/ OOB    -GI/DVT Prophylaxis.  - PT  - UZAIR stockings   --------------------------------------------  Case discussed with pt, cardio  Education given on findings and plan of care  ___________________________  H. PAOLA Herron.  Pager: [phone]

## 2018-05-21 ENCOUNTER — TRANSCRIPTION ENCOUNTER (OUTPATIENT)
Age: 78
End: 2018-05-21

## 2018-05-21 VITALS — SYSTOLIC BLOOD PRESSURE: 107 MMHG | TEMPERATURE: 98 F | HEART RATE: 66 BPM | DIASTOLIC BLOOD PRESSURE: 49 MMHG

## 2018-05-21 DIAGNOSIS — R42 DIZZINESS AND GIDDINESS: ICD-10-CM

## 2018-05-21 PROCEDURE — 71046 X-RAY EXAM CHEST 2 VIEWS: CPT

## 2018-05-21 PROCEDURE — 84443 ASSAY THYROID STIM HORMONE: CPT

## 2018-05-21 PROCEDURE — 99285 EMERGENCY DEPT VISIT HI MDM: CPT | Mod: 25

## 2018-05-21 PROCEDURE — 83036 HEMOGLOBIN GLYCOSYLATED A1C: CPT

## 2018-05-21 PROCEDURE — 93005 ELECTROCARDIOGRAM TRACING: CPT

## 2018-05-21 PROCEDURE — 78452 HT MUSCLE IMAGE SPECT MULT: CPT

## 2018-05-21 PROCEDURE — 84484 ASSAY OF TROPONIN QUANT: CPT

## 2018-05-21 PROCEDURE — 70450 CT HEAD/BRAIN W/O DYE: CPT | Mod: 26

## 2018-05-21 PROCEDURE — 93306 TTE W/DOPPLER COMPLETE: CPT

## 2018-05-21 PROCEDURE — 82550 ASSAY OF CK (CPK): CPT

## 2018-05-21 PROCEDURE — 82553 CREATINE MB FRACTION: CPT

## 2018-05-21 PROCEDURE — 82607 VITAMIN B-12: CPT

## 2018-05-21 PROCEDURE — 85610 PROTHROMBIN TIME: CPT

## 2018-05-21 PROCEDURE — A9502: CPT

## 2018-05-21 PROCEDURE — 85027 COMPLETE CBC AUTOMATED: CPT

## 2018-05-21 PROCEDURE — 70450 CT HEAD/BRAIN W/O DYE: CPT

## 2018-05-21 PROCEDURE — 82306 VITAMIN D 25 HYDROXY: CPT

## 2018-05-21 PROCEDURE — 80053 COMPREHEN METABOLIC PANEL: CPT

## 2018-05-21 PROCEDURE — 84100 ASSAY OF PHOSPHORUS: CPT

## 2018-05-21 PROCEDURE — 83735 ASSAY OF MAGNESIUM: CPT

## 2018-05-21 PROCEDURE — 93017 CV STRESS TEST TRACING ONLY: CPT

## 2018-05-21 PROCEDURE — 85730 THROMBOPLASTIN TIME PARTIAL: CPT

## 2018-05-21 PROCEDURE — 80061 LIPID PANEL: CPT

## 2018-05-21 RX ORDER — ASPIRIN/CALCIUM CARB/MAGNESIUM 324 MG
1 TABLET ORAL
Qty: 0 | Refills: 0 | COMMUNITY

## 2018-05-21 RX ORDER — SODIUM CHLORIDE 9 MG/ML
1000 INJECTION INTRAMUSCULAR; INTRAVENOUS; SUBCUTANEOUS
Qty: 0 | Refills: 0 | Status: DISCONTINUED | OUTPATIENT
Start: 2018-05-21 | End: 2018-05-21

## 2018-05-21 RX ORDER — LISINOPRIL 2.5 MG/1
1 TABLET ORAL
Qty: 0 | Refills: 0 | COMMUNITY

## 2018-05-21 RX ORDER — OXYBUTYNIN CHLORIDE 5 MG
1 TABLET ORAL
Qty: 0 | Refills: 0 | COMMUNITY

## 2018-05-21 RX ORDER — TOPIRAMATE 25 MG
1 TABLET ORAL
Qty: 0 | Refills: 0 | COMMUNITY

## 2018-05-21 RX ORDER — SIMVASTATIN 20 MG/1
1 TABLET, FILM COATED ORAL
Qty: 0 | Refills: 0 | COMMUNITY

## 2018-05-21 RX ADMIN — Medication 5 MILLIGRAM(S): at 12:11

## 2018-05-21 RX ADMIN — Medication 650 MILLIGRAM(S): at 07:13

## 2018-05-21 RX ADMIN — Medication 81 MILLIGRAM(S): at 12:11

## 2018-05-21 RX ADMIN — Medication 650 MILLIGRAM(S): at 06:22

## 2018-05-21 RX ADMIN — SODIUM CHLORIDE 70 MILLILITER(S): 9 INJECTION INTRAMUSCULAR; INTRAVENOUS; SUBCUTANEOUS at 12:10

## 2018-05-21 RX ADMIN — ENOXAPARIN SODIUM 30 MILLIGRAM(S): 100 INJECTION SUBCUTANEOUS at 12:11

## 2018-05-21 NOTE — DISCHARGE NOTE ADULT - PATIENT PORTAL LINK FT
You can access the [facility], offered by [facility], by registering with the following website: [email or web address]

## 2018-05-21 NOTE — DISCHARGE NOTE ADULT - MEDICATION SUMMARY - MEDICATIONS TO TAKE
I will START or STAY ON the medications listed below when I get home from the hospital:    aspirin 81 mg oral tablet  -- 1 tab(s) by mouth once a day  -- Indication: For Cardioprotection    lisinopril 10 mg oral tablet  -- 1 tab(s) by mouth once a day  -- Indication: For HTN (hypertension)    topiramate 50 mg oral tablet  -- 1 tab(s) by mouth 2 times a day  -- Indication: For CNS    simvastatin 20 mg oral tablet  -- 1 tab(s) by mouth once a day (at bedtime)  -- Indication: For Cardioprotection    oxybutynin 5 mg oral tablet  -- 1 tab(s) by mouth once a day  -- Indication: For Urine

## 2018-05-21 NOTE — PROGRESS NOTE ADULT - ASSESSMENT
77 y/o F pt with PMHx of HTN and HLd presented with family to ED complaining of palpitations with associated lightheadedness and general weakness since 18:30pm today.

## 2018-05-21 NOTE — PROGRESS NOTE ADULT - ASSESSMENT
78 yr old  Female  with PMHx of HTN and HLd presented with family to ED complainnig palpitations with associated lightheadedness and general weakness.  1.Tele monitoring.  2.Stress test-R/O ischemia.  3.Orthostatic BP q shift.  4.Continue current medication.  5.GI and DVT prophylaxis.

## 2018-05-21 NOTE — DISCHARGE NOTE ADULT - HOSPITAL COURSE
HPI: 77 y/o F pt with PMHx of HTN and HLd presented with family to ED complainnig palpitations with associated lightheadedness and general weakness since 18:30pm today. Per pt's son, pt was started on Nadolol by PMD though has not yet begun taking Nadolol. But Cardiology recommended to stop b blocker.     Pt was admitted for Palpitations. Admitted to telemetry for concern of arrhythmias. EKG was NSR with 93 bpm. Troponin x 2 Neg. Echo was 55% EF with G2 DD. She got a Stress test that was normal. Consultation was from Dr Merritt.   Pt was found to have Orthostatic dizziness.  SBP difference of > 20. She was given IV hydration. Normal CT head. Keep taking ASA and Statin.   Pt has HTN (hypertension).  She stayed Normotensive on Lisinopril.   Pt has HLD (hyperlipidemia) with normal lipid profile. Keep taking statin     Pt is stable for discharge as per Attending.

## 2018-05-21 NOTE — PROGRESS NOTE ADULT - SUBJECTIVE AND OBJECTIVE BOX
PGY 1 Note discussed with supervising resident and primary attending    Patient is a 78y old  Female who presents with a chief complaint of Palpitations (18 May 2018 01:49)      INTERVAL HPI/OVERNIGHT EVENTS: offers no new complaints; current symptoms resolving    MEDICATIONS  (STANDING):  aspirin  chewable 81 milliGRAM(s) Oral daily  enoxaparin Injectable 30 milliGRAM(s) SubCutaneous daily  lisinopril 10 milliGRAM(s) Oral daily  oxybutynin XL 5 milliGRAM(s) Oral daily  simvastatin 20 milliGRAM(s) Oral at bedtime  sodium chloride 0.9%. 1000 milliLiter(s) (70 mL/Hr) IV Continuous <Continuous>  topiramate 50 milliGRAM(s) Oral two times a day    MEDICATIONS  (PRN):  acetaminophen   Tablet. 650 milliGRAM(s) Oral every 6 hours PRN Mild Pain (1 - 3)      __________________________________________________    Vital Signs Last 24 Hrs  T(C): 36.6 (21 May 2018 08:31), Max: 37 (20 May 2018 12:35)  T(F): 97.9 (21 May 2018 08:31), Max: 98.6 (20 May 2018 12:35)  HR: 56 (21 May 2018 08:31) (56 - 78)  BP: 104/49 (21 May 2018 08:31) (104/49 - 119/42)  BP(mean): --  RR: 18 (21 May 2018 08:31) (17 - 18)  SpO2: 98% (21 May 2018 08:31) (97% - 99%)    ________________________________________________  PHYSICAL EXAM:  GENERAL: NAD Old [place] speaking female   HEENT: Normocephalic;  conjunctivae and sclerae clear; moist mucous membranes;   NECK : supple  CHEST/LUNG: Clear to auscultation bilaterally with good air entry   HEART: S1 S2  regular; no murmurs, gallops or rubs  ABDOMEN: Soft, Nontender, Nondistended; Bowel sounds present  EXTREMITIES: no cyanosis; no edema; no calf tenderness  SKIN: warm and dry; no rash  NERVOUS SYSTEM:  Awake and alert x 3    _________________________________________________      Plan of care was discussed with patient and /or primary care giver; all questions and concerns were addressed and care was aligned with patient's wishes.

## 2018-05-21 NOTE — DISCHARGE NOTE ADULT - PLAN OF CARE
Prevent heart failure Echo was 55% EF with G2 DD. She got a Stress test that was normal. Keep taking Aspirin and statin. Prevent fall Ct head was normal. She was dehydrated and had postural BP drop. She was given IV hydration in hospital. Keep adequate hydration to prevent fall and fracture after discharge

## 2018-05-21 NOTE — DISCHARGE NOTE ADULT - CARE PLAN
Principal Discharge DX:	Palpitations  Goal:	Prevent heart failure  Assessment and plan of treatment:	Echo was 55% EF with G2 DD. She got a Stress test that was normal. Keep taking Aspirin and statin.  Secondary Diagnosis:	Orthostatic dizziness  Goal:	Prevent fall  Assessment and plan of treatment:	Ct head was normal. She was dehydrated and had postural BP drop. She was given IV hydration in hospital. Keep adequate hydration to prevent fall and fracture after discharge

## 2018-05-21 NOTE — PROGRESS NOTE ADULT - PROBLEM SELECTOR PLAN 1
Presented with palpitations; Admitted to telemetry for concern of arrhythmias  EKG NSR with 93 bpm  Troponin x 2 Neg  Echo: 55% EF with G2 DD  F/u Stress test  Consulted Dr Merritt

## 2018-05-21 NOTE — PROGRESS NOTE ADULT - SUBJECTIVE AND OBJECTIVE BOX
CHIEF COMPLAINT:Patient is a 78y old  Female who presents with a chief complaint of Palpitations .Pt appears comfortable.    	  REVIEW OF SYSTEMS:  CONSTITUTIONAL: No fever, weight loss, or fatigue  EYES: No eye pain, visual disturbances, or discharge  ENT:  No difficulty hearing, tinnitus, vertigo; No sinus or throat pain  NECK: No pain or stiffness  RESPIRATORY: No cough, wheezing, chills or hemoptysis; No Shortness of Breath  CARDIOVASCULAR: No chest pain, palpitations, passing out, dizziness, or leg swelling  GASTROINTESTINAL: No abdominal or epigastric pain. No nausea, vomiting, or hematemesis; No diarrhea or constipation. No melena or hematochezia.  GENITOURINARY: No dysuria, frequency, hematuria, or incontinence  NEUROLOGICAL: No headaches, memory loss, loss of strength, numbness, or tremors  SKIN: No itching, burning, rashes, or lesions   LYMPH Nodes: No enlarged glands  ENDOCRINE: No heat or cold intolerance; No hair loss  MUSCULOSKELETAL: No joint pain or swelling; No muscle, back, or extremity pain  PSYCHIATRIC: No depression, anxiety, mood swings, or difficulty sleeping  HEME/LYMPH: No easy bruising, or bleeding gums  ALLERGY AND IMMUNOLOGIC: No hives or eczema	      PHYSICAL EXAM:  T(C): 36.6 (05-21-18 @ 05:14), Max: 37 (05-20-18 @ 12:35)  HR: 77 (05-21-18 @ 05:14) (64 - 78)  BP: 119/42 (05-20-18 @ 12:35) (119/42 - 122/43)  RR: 17 (05-21-18 @ 05:14) (17 - 18)  SpO2: 98% (05-21-18 @ 05:14) (97% - 99%)  Wt(kg): --  I&O's Summary    20 May 2018 07:01  -  21 May 2018 07:00  --------------------------------------------------------  IN: 350 mL / OUT: 350 mL / NET: 0 mL        Appearance: Normal	  HEENT:   Normal oral mucosa, PERRL, EOMI	  Lymphatic: No lymphadenopathy  Cardiovascular: Normal S1 S2, No JVD, No murmurs, No edema  Respiratory: Lungs clear to auscultation	  Psychiatry: A & O x 3, Mood & affect appropriate  Gastrointestinal:  Soft, Non-tender, + BS	  Skin: No rashes, No ecchymoses, No cyanosis	  Neurologic: Non-focal  Extremities: Normal range of motion, No clubbing, cyanosis or edema  Vascular: Peripheral pulses palpable 2+ bilaterally    MEDICATIONS  (STANDING):  aspirin  chewable 81 milliGRAM(s) Oral daily  enoxaparin Injectable 30 milliGRAM(s) SubCutaneous daily  lisinopril 10 milliGRAM(s) Oral daily  oxybutynin XL 5 milliGRAM(s) Oral daily  simvastatin 20 milliGRAM(s) Oral at bedtime  topiramate 50 milliGRAM(s) Oral two times a day      TELEMETRY: 	SR 40's      	  LABS:	 	    Lipid Profile: Cholesterol 167  LDL 82  HDL 71  TG 68    HgA1c: Hemoglobin A1C, Whole Blood: 5.5 % (05-18 @ 09:27)    TSH: Thyroid Stimulating Hormone, Serum: 0.66 uU/mL (05-18 @ 06:02)    Echocardiogram:    OBSERVATIONS:  Mitral Valve: Normal mitral valve. Mild mitral  regurgitation.  Aortic Root: Aortic Root: 3.0 cm.  Aortic Valve: Normal trileaflet aortic valve.  Left Atrium: Normal left atrium.  Left Ventricle: Normal LeftVentricular Systolic Function,  (EF = 55 to 60%) Normal left ventricular internal  dimensions and wall thicknesses. Grade II diastolic  dysfunction.  Right Heart: Normal right atrium. Normal right ventricular  size and function. There is mild tricuspid regurgitation.  Pericardium/PleuraSmall pericardial effusion. No  echocardiographic evidence of tamponade physiology.  Hemodynamic: RA Pressure is 10 mm Hg. RV systolic pressure  is 35 mm Hg.

## 2018-05-21 NOTE — PROGRESS NOTE ADULT - SUBJECTIVE AND OBJECTIVE BOX
_________________________________________________________________________________________  ========>>  M E D I C A L   A T T E N D I N G    F O L L O W  U P  N O T E  <<=========  -----------------------------------------------------------------------------------------------------    - Patient seen and examined by me approximately thirty minutes ago.  - In summary, patient is a 78y year old woman who originally presented with Palpitations  - Patient today overall doing ok, comfortable, eating OK.  overall improved    ==================>> REVIEW OF SYSTEM <<=================    GEN: no fever, no chills, no pain  RESP: no SOB, no cough, no sputum  CVS: no chest pain, no palpitations, no edema  GI: no abdominal pain, no nausea, no constipation, no diarrhea  : no dysuria, no frequency, no hematuria  Neuro: no headache, mild lightheadedness, c/o some numbness on hands R>L  Derm : no itching, no rash    ==================>> PHYSICAL EXAM <<=================    GEN: A&O X 3 , NAD , comfortable, cachectic / weak appearing in bed  HEENT: NCAT, PERRL, MMM, hearing intact  Neck: supple , no JVD  CVS: S1S2 , regular , No M/R/G appreciated  PULM: CTA B/L,  no W/R/R appreciated  ABD.: soft. non tender, non distended,  bowel sounds present  Extrem: intact pulses , no edema   PSYCH : normal mood,  not anxious       ==================>> MEDICATIONS <<====================    aspirin  chewable 81 milliGRAM(s) Oral daily  enoxaparin Injectable 30 milliGRAM(s) SubCutaneous daily  lisinopril 10 milliGRAM(s) Oral daily  oxybutynin XL 5 milliGRAM(s) Oral daily  simvastatin 20 milliGRAM(s) Oral at bedtime  sodium chloride 0.9%. 1000 milliLiter(s) IV Continuous <Continuous>  topiramate 50 milliGRAM(s) Oral two times a day    MEDICATIONS  (PRN):  acetaminophen   Tablet. 650 milliGRAM(s) Oral every 6 hours PRN Mild Pain (1 - 3)    ==================>> VITAL SIGNS <<==================    Vital Signs Last 24 Hrs  T(C): 36.6 (05-21-18 @ 12:21)  T(F): 97.8 (05-21-18 @ 12:21), Max: 98.4 (05-20-18 @ 21:56)  HR: 66 (05-21-18 @ 12:21) (56 - 78)  BP: 107/49 (05-21-18 @ 12:21)  BP(mean): --  RR: 18 (05-21-18 @ 08:31) (17 - 18)  SpO2: 98% (05-21-18 @ 08:31) (97% - 99%)       ==================>> LAB AND IMAGING <<==================       < from: Nuclear Stress Test-Pharmacologic (05.21.18 @ 08:39) >  IMPRESSIONS:Normal Study  * Negative ECG evidence of ischemia after IV of Lexiscan.  * Review of raw data shows: The study is of good technical  quality.  * The left ventricle was normal in size. Normal myocardial  perfusion scan, with no evidence of infarction or  inducible ischemia.  * Post-stress resting myocardial perfusion gated SPECT  imaging was performed (LVEF > 70%)    < end of copied text >  < from: CT Head No Cont (05.21.18 @ 12:43) >  Impression:  1. Unremarkable noncontrast CT scan of the brain.      < end of copied text >    < from: Transthoracic Echocardiogram (05.18.18 @ 07:13) >  CONCLUSIONS:  1. Normal mitral valve. Mild mitral regurgitation.  2. Normal trileaflet aortic valve.  3. Aortic Root: 3.0 cm.  4. Normal left atrium.  5. Normal left ventricular internal dimensions and wall thicknesses.  6. Normal Left Ventricular Systolic Function,  (EF = 55 to 60%)  7. Grade II diastolic dysfunction.  8. Normal right atrium.  9. Normal right ventricular size and function.  10. RA Pressure is 10 mm Hg.  11. RV systolic pressure is 35 mm Hg.  12. There is mild tricuspid regurgitation.  13. Normal pericardium with no pericardial effusion.  < end of copied text >    __________________________________________________________________________________  ===============>>  A S S E S S M E N T   A N D   P L A N <<===============  ------------------------------------------------------------------------------------------    · Assessment		  79 y/o F pt with PMHx of HTN and HLd presented with family to ED complaining of palpitations with associated lightheadedness and general weakness.    Problem/Plan - 1:  ·  Problem: Palpitations, resolved, unclear etiology   pt likely also deconditioned   Cardiology appreciated  echo and stress as above  OOB, PT, ambulate    Problem/Plan - 2:  ·  Problem: HTN  Continue Current medications and monitor.   discussed with son re possible decrease in meds as outpatient, per PMd     Problem/Plan - 3:  ·  Problem: HLD   Continue Current medications and monitor.   discussed with son re possible decrease in meds as outpatient, per PMd     Problem/Plan - 4:  ·  Problem: lightheaded and numbness  B12 normal  CT head normal   monitor vitals as outpatient   PT/ OOB    -GI/DVT Prophylaxis.  - PT  - UZAIR stockings   --------------------------------------------  Case discussed with pt, pt's son   Education given on findings and plan of care  ___________________________  H. PAOLA Herron.  Pager: [phone]

## 2020-05-12 NOTE — ED PROVIDER NOTE - MEDICAL DECISION MAKING DETAILS
Patient states she is urinating more frequent when taking blood pressure medication- wondering if this is normal?  CP on exertion 1.5hrs PTA, HEART score 4, ECG/trop negative at this time, will be admitted to rule this out. Low suspicion for PE with associated symptoms and no associated signs. Low suspicion for dissection by character and no murmur or pulse asymmetry. No PTX or PNA. No arrhythmia. No anemia. Patient also appears to have moderate dehydration by exam. Given ASA (had some earlier today) and fluids. Admitted to internal medicine for further monitoring, w/u, and care.

## 2021-06-21 NOTE — ED ADULT TRIAGE NOTE - NS ED NURSE DIRECT TO ROOM YN
Letter by Meagan Miller at      Author: Meagan Miller Service: -- Author Type: --    Filed:  Encounter Date: 1/14/2021 Status: (Other)         Destinee Green  [address] [place] [id number]      January 14, 2021      Dear MsCydney Green,    RE: Remote Results    We are writing to you regarding your recent Remote Pacemaker check from home. Your transmission was received successfully. Battery status is satisfactory at this time.     Your results are showing no significant changes.    Your next device appointment will be a remote check on April 16, 2021.  You can choose the time of day you wish to transmit.    To schedule or reschedule, please call [phone] and press 1.    NOTE: If you would like to do an extra transmission, please call [phone] and press 3 to speak to a nurse BEFORE transmitting. This ensures that the Device Clinic staff is aware of the reason you are sending a transmission, and can follow-up with you after it has been reviewed.    We will be checking your implanted device from home (remotely) every three months unless otherwise instructed. We will need to see you in the clinic at least once a year. You may need to be seen in the clinic sooner depending on the results of your check.    Please be aware:    The follow-up schedule is like a Physician prescription.    Your remote monitor is paired to your specific implanted device.      Sincerely,    [facility]        Yes

## 2023-04-18 NOTE — ED ADULT NURSE NOTE - NS ED PATIENT SAFETY CONCERN
We want to give the best care possible. If you are notified to take a patient experience survey, please take the time to answer the questions. Your feedback helps us know how we are doing and we really appreciate it. Thank you!     No

## 2025-08-05 ENCOUNTER — LABORATORY RESULT (OUTPATIENT)
Age: 85
End: 2025-08-05

## 2025-08-05 ENCOUNTER — APPOINTMENT (OUTPATIENT)
Dept: NEUROLOGY | Facility: CLINIC | Age: 85
End: 2025-08-05
Payer: MEDICARE

## 2025-08-05 ENCOUNTER — NON-APPOINTMENT (OUTPATIENT)
Age: 85
End: 2025-08-05

## 2025-08-05 VITALS
BODY MASS INDEX: 26.15 KG/M2 | WEIGHT: 113 LBS | SYSTOLIC BLOOD PRESSURE: 176 MMHG | HEART RATE: 77 BPM | HEIGHT: 55 IN | DIASTOLIC BLOOD PRESSURE: 75 MMHG | TEMPERATURE: 98 F | OXYGEN SATURATION: 97 %

## 2025-08-05 VITALS — SYSTOLIC BLOOD PRESSURE: 181 MMHG | DIASTOLIC BLOOD PRESSURE: 79 MMHG | HEART RATE: 73 BPM

## 2025-08-05 DIAGNOSIS — R41.3 OTHER AMNESIA: ICD-10-CM

## 2025-08-05 DIAGNOSIS — B35.1 TINEA UNGUIUM: ICD-10-CM

## 2025-08-05 DIAGNOSIS — Z78.9 OTHER SPECIFIED HEALTH STATUS: ICD-10-CM

## 2025-08-05 DIAGNOSIS — B49 UNSPECIFIED MYCOSIS: ICD-10-CM

## 2025-08-05 DIAGNOSIS — Z86.79 PERSONAL HISTORY OF OTHER DISEASES OF THE CIRCULATORY SYSTEM: ICD-10-CM

## 2025-08-05 PROCEDURE — 99205 OFFICE O/P NEW HI 60 MIN: CPT

## 2025-08-05 RX ORDER — PENICILLAMINE 250 MG/1
CAPSULE ORAL
Refills: 0 | Status: ACTIVE | COMMUNITY

## 2025-08-05 RX ORDER — LISINOPRIL 20 MG/1
20 TABLET ORAL
Refills: 0 | Status: ACTIVE | COMMUNITY

## 2025-08-07 LAB
FOLATE SERPL-MCNC: 16.6 NG/ML
T PALLIDUM AB SER QL IA: POSITIVE
T3 SERPL-MCNC: 95 NG/DL
T4 SERPL-MCNC: 7.4 UG/DL
TSH SERPL-ACNC: 0.63 UIU/ML
VIT B12 SERPL-MCNC: 513 PG/ML

## 2025-08-12 ENCOUNTER — APPOINTMENT (OUTPATIENT)
Dept: MRI IMAGING | Facility: CLINIC | Age: 85
End: 2025-08-12

## 2025-08-12 ENCOUNTER — APPOINTMENT (OUTPATIENT)
Dept: NEUROLOGY | Facility: CLINIC | Age: 85
End: 2025-08-12
Payer: MEDICARE

## 2025-08-12 VITALS
DIASTOLIC BLOOD PRESSURE: 68 MMHG | HEART RATE: 72 BPM | BODY MASS INDEX: 26.15 KG/M2 | TEMPERATURE: 97.6 F | WEIGHT: 113 LBS | SYSTOLIC BLOOD PRESSURE: 133 MMHG | OXYGEN SATURATION: 98 % | HEIGHT: 55 IN

## 2025-08-12 DIAGNOSIS — R41.3 OTHER AMNESIA: ICD-10-CM

## 2025-08-12 DIAGNOSIS — R89.9 UNSPECIFIED ABNORMAL FINDING IN SPECIMENS FROM OTHER ORGANS, SYSTEMS AND TISSUES: ICD-10-CM

## 2025-08-12 LAB — METHYLMALONATE SERPL-SCNC: 136 NMOL/L

## 2025-08-12 PROCEDURE — G2211 COMPLEX E/M VISIT ADD ON: CPT

## 2025-08-12 PROCEDURE — 70551 MRI BRAIN STEM W/O DYE: CPT

## 2025-08-12 PROCEDURE — 99215 OFFICE O/P EST HI 40 MIN: CPT

## 2025-09-04 ENCOUNTER — APPOINTMENT (OUTPATIENT)
Dept: INFECTIOUS DISEASE | Facility: CLINIC | Age: 85
End: 2025-09-04

## 2025-09-04 VITALS
SYSTOLIC BLOOD PRESSURE: 150 MMHG | TEMPERATURE: 98.1 F | OXYGEN SATURATION: 96 % | BODY MASS INDEX: 26.38 KG/M2 | HEIGHT: 55 IN | HEART RATE: 75 BPM | WEIGHT: 114 LBS | DIASTOLIC BLOOD PRESSURE: 83 MMHG

## 2025-09-04 PROCEDURE — 99204 OFFICE O/P NEW MOD 45 MIN: CPT

## 2025-09-11 ENCOUNTER — NON-APPOINTMENT (OUTPATIENT)
Age: 85
End: 2025-09-11